# Patient Record
Sex: FEMALE | Race: WHITE | NOT HISPANIC OR LATINO | Employment: UNEMPLOYED | URBAN - METROPOLITAN AREA
[De-identification: names, ages, dates, MRNs, and addresses within clinical notes are randomized per-mention and may not be internally consistent; named-entity substitution may affect disease eponyms.]

---

## 2018-09-12 ENCOUNTER — HOSPITAL ENCOUNTER (EMERGENCY)
Facility: HOSPITAL | Age: 28
Discharge: HOME | End: 2018-09-12
Attending: EMERGENCY MEDICINE | Admitting: EMERGENCY MEDICINE

## 2018-09-12 VITALS
HEIGHT: 60 IN | TEMPERATURE: 98.2 F | SYSTOLIC BLOOD PRESSURE: 110 MMHG | OXYGEN SATURATION: 95 % | BODY MASS INDEX: 26.31 KG/M2 | WEIGHT: 134 LBS | RESPIRATION RATE: 15 BRPM | DIASTOLIC BLOOD PRESSURE: 75 MMHG | HEART RATE: 73 BPM

## 2018-09-12 DIAGNOSIS — N93.9 VAGINAL BLEEDING: Primary | ICD-10-CM

## 2018-09-12 LAB
AMORPH URATE CRY URNS QL MICRO: ABNORMAL /HPF
B-HCG UR QL: NEGATIVE
BACTERIA UR QL AUTO: ABNORMAL /HPF
BILIRUB UR QL STRIP: NEGATIVE
CLARITY UR: ABNORMAL
CLUE CELLS SPEC QL WET PREP: ABNORMAL
COLOR UR: YELLOW
GLUCOSE UR STRIP-MCNC: NEGATIVE MG/DL
HGB UR QL STRIP.AUTO: ABNORMAL
HYALINE CASTS UR QL AUTO: ABNORMAL /LPF
HYDATID CYST SPEC WET PREP: ABNORMAL
KETONES UR QL STRIP: NEGATIVE
KOH PREP NAIL: NORMAL
LEUKOCYTE ESTERASE UR QL STRIP.AUTO: NEGATIVE
NITRITE UR QL STRIP: NEGATIVE
PH UR STRIP.AUTO: 8 [PH] (ref 4.5–8)
PROT UR QL STRIP: NEGATIVE
RBC # UR: ABNORMAL /HPF
REF LAB TEST METHOD: ABNORMAL
SP GR UR STRIP: 1.01 (ref 1–1.03)
SQUAMOUS #/AREA URNS HPF: ABNORMAL /HPF
T VAGINALIS SPEC QL WET PREP: ABNORMAL
UROBILINOGEN UR QL STRIP: ABNORMAL
WBC SPEC QL WET PREP: ABNORMAL
WBC UR QL AUTO: ABNORMAL /HPF
YEAST GENITAL QL WET PREP: ABNORMAL

## 2018-09-12 PROCEDURE — 87081 CULTURE SCREEN ONLY: CPT | Performed by: EMERGENCY MEDICINE

## 2018-09-12 PROCEDURE — 87210 SMEAR WET MOUNT SALINE/INK: CPT | Performed by: EMERGENCY MEDICINE

## 2018-09-12 PROCEDURE — 87591 N.GONORRHOEAE DNA AMP PROB: CPT | Performed by: EMERGENCY MEDICINE

## 2018-09-12 PROCEDURE — 99283 EMERGENCY DEPT VISIT LOW MDM: CPT

## 2018-09-12 PROCEDURE — 81025 URINE PREGNANCY TEST: CPT | Performed by: EMERGENCY MEDICINE

## 2018-09-12 PROCEDURE — 87491 CHLMYD TRACH DNA AMP PROBE: CPT | Performed by: EMERGENCY MEDICINE

## 2018-09-12 PROCEDURE — 99282 EMERGENCY DEPT VISIT SF MDM: CPT | Performed by: EMERGENCY MEDICINE

## 2018-09-12 PROCEDURE — 87220 TISSUE EXAM FOR FUNGI: CPT | Performed by: EMERGENCY MEDICINE

## 2018-09-12 PROCEDURE — 81001 URINALYSIS AUTO W/SCOPE: CPT | Performed by: EMERGENCY MEDICINE

## 2018-09-12 PROCEDURE — 86592 SYPHILIS TEST NON-TREP QUAL: CPT | Performed by: EMERGENCY MEDICINE

## 2018-09-12 NOTE — ED PROVIDER NOTES
Subjective     History provided by:  Patient    History of Present Illness    · Chief complaint: Vaginal discharge and vaginal bleeding    · Location: Vaginal    · Quality/Severity: The patient has an increased whitish vaginal discharge with a older.  She also started having vaginal bleeding in spite of finishing her menstrual period 4 days ago.    · Timing/Onset: 3 days    · Modifying Factors: None    · Associated symptoms: Bilateral low back pain and pressure in her pelvic area.    · Narrative: The patient is a 27-year-old white female complaining of a three-day history of low back pain, a whitish vaginal discharge and has an odor, and restarting vaginal bleeding in spite of having her menstrual period 4 days ago.  The patient had both of her fallopian tubes removed for ovarian cyst.  She is sexually active and doesn't use condoms.  She's been in a monogamous relationship for the past 9 years.    ED Triage Vitals [09/12/18 1919]   Temp Heart Rate Resp BP SpO2   98.2 °F (36.8 °C) 78 16 117/74 99 %      Temp src Heart Rate Source Patient Position BP Location FiO2 (%)   Oral Monitor Sitting Right arm --       Review of Systems   Constitutional: Negative for activity change, appetite change, chills, diaphoresis, fatigue and fever.   HENT: Negative for congestion, dental problem, ear pain, hearing loss, mouth sores, postnasal drip, rhinorrhea, sinus pressure, sore throat and voice change.    Eyes: Negative for photophobia, pain, discharge, redness and visual disturbance.   Respiratory: Negative for cough, chest tightness, shortness of breath, wheezing and stridor.    Cardiovascular: Negative for chest pain, palpitations and leg swelling.   Gastrointestinal: Negative for abdominal pain, diarrhea, nausea and vomiting.   Genitourinary: Positive for vaginal bleeding and vaginal discharge. Negative for difficulty urinating, dysuria, flank pain, frequency, hematuria and urgency.   Musculoskeletal: Positive for back pain.  Negative for arthralgias, gait problem, joint swelling, myalgias, neck pain and neck stiffness.   Skin: Negative for color change and rash.   Neurological: Negative for dizziness, tremors, seizures, syncope, facial asymmetry, speech difficulty, weakness, light-headedness, numbness and headaches.   Hematological: Negative for adenopathy.   Psychiatric/Behavioral: Negative.  Negative for confusion and decreased concentration. The patient is not nervous/anxious.        History reviewed. No pertinent past medical history.    No Known Allergies    Past Surgical History:   Procedure Laterality Date   • ADENOIDECTOMY     • OVARIAN CYST DRAINAGE     • SALPINGECTOMY     • TONSILLECTOMY         History reviewed. No pertinent family history.    Social History     Social History   • Marital status:      Social History Main Topics   • Smoking status: Current Every Day Smoker     Packs/day: 0.50     Types: Cigarettes      Comment: 3 CIG PER DAY   • Alcohol use Yes      Comment: OCC   • Drug use: Yes     Types: Marijuana      Comment: 3 WEEKS   • Sexual activity: Yes      Comment: PT HAS TO HAVE IVF TO BE PREG     Other Topics Concern   • Not on file           Objective   Physical Exam   Constitutional: She is oriented to person, place, and time. She appears well-developed and well-nourished. No distress.   HENT:   Head: Normocephalic and atraumatic.   Nose: Nose normal.   Mouth/Throat: Oropharynx is clear and moist. No oropharyngeal exudate.   Eyes: Pupils are equal, round, and reactive to light. EOM are normal. Right eye exhibits no discharge. Left eye exhibits no discharge. No scleral icterus.   Neck: Normal range of motion. Neck supple. No JVD present. No thyromegaly present.   Cardiovascular: Normal rate, regular rhythm and normal heart sounds.    No murmur heard.  Pulmonary/Chest: Effort normal and breath sounds normal. She has no wheezes. She has no rales. She exhibits no tenderness.   Abdominal: Soft. Bowel sounds  are normal. She exhibits no distension. There is no tenderness.   Genitourinary:   Genitourinary Comments: The patient has dark red blood in the vaginal vault.  There is no abnormal discharge in the vaginal vault.  She has minimal cervix tenderness to manipulation, he does not have a chandelier sign.  She has no adnexal tenderness or mass.  Uterine size was difficult to appreciate due to obesity.   Musculoskeletal: Normal range of motion. She exhibits tenderness (bilateral flank and lower to percussion). She exhibits no edema or deformity.   Lymphadenopathy:     She has no cervical adenopathy.   Neurological: She is alert and oriented to person, place, and time. No cranial nerve deficit. Coordination normal.   No focal motor sensory deficit   Skin: Skin is warm and dry. Capillary refill takes less than 2 seconds. No rash noted. She is not diaphoretic.   Psychiatric: She has a normal mood and affect. Her behavior is normal. Judgment and thought content normal.   Nursing note and vitals reviewed.      Procedures           ED Course  ED Course as of Sep 12 2056   Wed Sep 12, 2018   2054 On exam the patient only had vaginal bleeding.  She did not clinically have evidence of an STD or PID.  Review of her laboratory studies: Her urinalysis was negative for infection.  Her beta-hCG was negative.  Her wet prep was negative for clue cells or trichomonas.  Her KOH was negative for hyphae.  Her RPR, chlamydia and gonorrhea screens are pending.  [TP]   2055 The patient has some dysfunctional vaginal bleeding.  I informed her that we will contact her should any of her culture results come back positive.  I instructed her that should her bleeding persists she should follow-up with her gynecologist Dr. Grant Dwyer.  [TP]      ED Course User Index  [TP] Lauri Good MD                  MDM  Number of Diagnoses or Management Options  Vaginal bleeding: new and requires workup     Amount and/or Complexity of Data  Reviewed  Clinical lab tests: ordered and reviewed    Patient Progress  Patient progress: stable        Final diagnoses:   Vaginal bleeding           Labs Reviewed   WET PREP, GENITAL - Abnormal; Notable for the following:        Result Value    WBC'S 1+ WBC's seen (*)     All other components within normal limits   URINALYSIS W/ MICROSCOPIC IF INDICATED (NO CULTURE) - Abnormal; Notable for the following:     Appearance, UA Cloudy (*)     Blood, UA Large (3+) (*)     All other components within normal limits   URINALYSIS, MICROSCOPIC ONLY - Abnormal; Notable for the following:     RBC, UA 13-20 (*)     Bacteria, UA Trace (*)     All other components within normal limits   KOH PREP - Normal   PREGNANCY, URINE - Normal   GONOCOCCUS CULTURE   CHLAMYDIA TRACHOMATIS, NEISSERIA GONORRHOEAE, PCR   RPR     No orders to display          Medication List      No changes were made to your prescriptions during this visit.            Lauri Good MD  09/12/18 6904

## 2018-09-13 LAB — RPR SER QL: NORMAL

## 2018-09-14 LAB
C TRACH RRNA SPEC DONR QL NAA+PROBE: NEGATIVE
N GONORRHOEA DNA SPEC QL NAA+PROBE: NEGATIVE

## 2018-09-15 LAB — BACTERIA SPEC AEROBE CULT: NORMAL

## 2024-12-11 DIAGNOSIS — K21.9 GASTROESOPHAGEAL REFLUX DISEASE, UNSPECIFIED WHETHER ESOPHAGITIS PRESENT: Primary | ICD-10-CM

## 2024-12-11 RX ORDER — PANTOPRAZOLE SODIUM 40 MG/1
40 TABLET, DELAYED RELEASE ORAL DAILY
Qty: 90 TABLET | Refills: 3 | Status: SHIPPED | OUTPATIENT
Start: 2024-12-11 | End: 2025-12-11

## 2025-03-04 ENCOUNTER — OFFICE VISIT (OUTPATIENT)
Dept: OBSTETRICS AND GYNECOLOGY | Age: 35
End: 2025-03-04
Payer: COMMERCIAL

## 2025-03-04 VITALS
WEIGHT: 152.4 LBS | HEIGHT: 60 IN | SYSTOLIC BLOOD PRESSURE: 124 MMHG | DIASTOLIC BLOOD PRESSURE: 72 MMHG | BODY MASS INDEX: 29.92 KG/M2

## 2025-03-04 DIAGNOSIS — Z13.29 SCREENING FOR THYROID DISORDER: ICD-10-CM

## 2025-03-04 DIAGNOSIS — Z13.1 SCREENING FOR DIABETES MELLITUS: ICD-10-CM

## 2025-03-04 DIAGNOSIS — Z01.419 ENCOUNTER FOR GYNECOLOGICAL EXAMINATION WITHOUT ABNORMAL FINDING: ICD-10-CM

## 2025-03-04 DIAGNOSIS — Z20.2 POSSIBLE EXPOSURE TO STD: ICD-10-CM

## 2025-03-04 DIAGNOSIS — Z31.9 DESIRE FOR PREGNANCY: Primary | ICD-10-CM

## 2025-03-04 DIAGNOSIS — R79.89 LOW VITAMIN D LEVEL: ICD-10-CM

## 2025-03-04 RX ORDER — FLUCONAZOLE 150 MG/1
150 TABLET ORAL ONCE
Qty: 2 TABLET | Refills: 0 | Status: SHIPPED | OUTPATIENT
Start: 2025-03-04 | End: 2025-03-04

## 2025-03-04 NOTE — PROGRESS NOTES
Routine Annual Visit    3/4/2025    Patient: Betty Padilla          MR#:[id number]      Chief Complaint   Patient presents with    Gynecologic Exam     New pt, AE today. Last AE several years. Pt has noticed increased vaginal irritation, self treated with monistat about 3 weeks ago        History of Present Illness    34 y.o. female  who presents for annual exam.     Three weeks ago had yeast infection and used Monistat and it seemed to go away but then recently she has some discomfort around the introitus again.    Menses are monthly and regular    Hx bilateral salpingectomy. Issue w STI and sounds like PID, sounds like TOA and had emergency surgery.  Had follow up surgery as well and has had both tubes removed eventually.     Did IVF with Dr. Owens in [place] in 2016 and had a fresh transfer and twin pregnancy and spontaneous loss. Does not have any frozen embryos. She is planning to pursue IVF again soon and doing some research, discussed this today.    Patient's last menstrual period was 2025 (approximate).  Obstetric History:  OB History          3    Para   1    Term   1            AB   2    Living   1         SAB   2    IAB        Ectopic        Molar        Multiple        Live Births   1               Menstrual History:     Patient's last menstrual period was 2025 (approximate).       ________________________________________  There is no problem list on file for this patient.      Past Medical History:   Diagnosis Date    Abnormal Pap smear of cervix        Family History   Problem Relation Age of Onset    Hypertension Mother     Ovarian cancer Maternal Grandmother        Past Surgical History:   Procedure Laterality Date    ADENOIDECTOMY      OVARIAN CYST DRAINAGE      SALPINGECTOMY      TONSILLECTOMY         Social History     Tobacco Use   Smoking Status Former    Current packs/day: 0.50    Types: Cigarettes   Smokeless Tobacco Never   Tobacco Comments    3 CIG  "PER DAY       has a current medication list which includes the following prescription(s): multivitamin with minerals and zinc.  ________________________________________        Objective   Physical Exam    /72   Ht 152.4 cm (60\")   Wt 69.1 kg (152 lb 6.4 oz)   LMP 02/12/2025 (Approximate)   BMI 29.76 kg/m²    BP Readings from Last 3 Encounters:   03/04/25 124/72   01/31/25 114/74   12/03/24 114/77      Wt Readings from Last 3 Encounters:   03/04/25 69.1 kg (152 lb 6.4 oz)   01/31/25 61.7 kg (136 lb)   12/03/24 59.9 kg (132 lb 0.9 oz)         BMI: Body mass index is 29.76 kg/m².       General:   alert, appears stated age, and cooperative   Neck: No thyromegaly or LAD   Abdomen: soft, non-tender, without masses or organomegaly   Breast: inspection negative, no nipple discharge or bleeding, no masses or nodularity palpable   Urethra and bladder: urethral meatus normal; bladder nontender to palpation;   Vulva: normal, Bartholin's, Urethra, Shinnston's normal   Vagina: Mild thick white discharge, some erythema of the introitus   Cervix: multiparous appearance and no lesions   Uterus: normal size, non-tender, and anteverted   Adnexa: normal adnexa and no mass, fullness, tenderness       Assessment:    normal annual exam   Vaginitis  Planning for pregnancy/IVF    Plan:    Plan     []  Mammogram request made  [x]  PAP done  []  Labs:   [x]  GC/Chl/TV  []  DEXA scan   []  Referral for colonoscopy:     Diagnoses and all orders for this visit:    1. Desire for pregnancy (Primary)  -     Rubella Antibody, IgG  -     Varicella Zoster Antibody, IgG    2. Screening for diabetes mellitus  -     Hemoglobin A1c    3. Low vitamin D level  -     Vitamin D 25 Hydroxy    4. Screening for thyroid disorder  -     TSH Rfx On Abnormal To Free T4    5. Encounter for gynecological examination without abnormal finding  -     IGP, Apt HPV,rfx 16 / 18,45    6. Possible exposure to STD  -     NuSwab VG+ - Swab, Cervix          Counseling  [] " Menopause  [x]  Nutrition  [x]  Physical activity/regular exercise   [x]  Healthy weight  []  Injury prevention  []  Smoking cessation  []  Substance misuse/abuse  [x]  Sexual behavior  []  STD prevention  []  Contraception  []  Dental health  [x]  Mental health  []  Immunization  [x]  Encouraged SBE        Janet Matthew MD  03/04/2025  13:47 EST

## 2025-03-05 LAB
25(OH)D3+25(OH)D2 SERPL-MCNC: 29.4 NG/ML (ref 30–100)
HBA1C MFR BLD: 5.1 % (ref 4.8–5.6)
RUBV IGG SERPL IA-ACNC: 1.94 INDEX
TSH SERPL DL<=0.005 MIU/L-ACNC: 1.72 UIU/ML (ref 0.45–4.5)
VZV IGG SER QL IA: REACTIVE

## 2025-03-06 LAB
A VAGINAE DNA VAG QL NAA+PROBE: ABNORMAL SCORE
BVAB2 DNA VAG QL NAA+PROBE: ABNORMAL SCORE
C ALBICANS DNA VAG QL NAA+PROBE: POSITIVE
C GLABRATA DNA VAG QL NAA+PROBE: NEGATIVE
C TRACH DNA SPEC QL NAA+PROBE: NEGATIVE
CYTOLOGIST CVX/VAG CYTO: NORMAL
CYTOLOGY CVX/VAG DOC CYTO: NORMAL
CYTOLOGY CVX/VAG DOC THIN PREP: NORMAL
DX ICD CODE: NORMAL
HPV I/H RISK 4 DNA CVX QL PROBE+SIG AMP: NEGATIVE
MEGA1 DNA VAG QL NAA+PROBE: ABNORMAL SCORE
N GONORRHOEA DNA VAG QL NAA+PROBE: NEGATIVE
OTHER STN SPEC: NORMAL
SERVICE CMNT-IMP: NORMAL
STAT OF ADQ CVX/VAG CYTO-IMP: NORMAL
T VAGINALIS DNA VAG QL NAA+PROBE: NEGATIVE

## 2025-06-02 ENCOUNTER — TELEPHONE (OUTPATIENT)
Dept: OBSTETRICS AND GYNECOLOGY | Age: 35
End: 2025-06-02
Payer: COMMERCIAL

## 2025-06-02 RX ORDER — FLUCONAZOLE 150 MG/1
150 TABLET ORAL ONCE
Qty: 2 TABLET | Refills: 0 | Status: SHIPPED | OUTPATIENT
Start: 2025-06-02 | End: 2025-06-02

## 2025-06-02 NOTE — TELEPHONE ENCOUNTER
Pt called states she has a yeast infection caused she's taking antibiotics for an upper respiratory infection . Pt wants to know if you can send her something in. Please advise

## 2025-06-04 ENCOUNTER — TELEPHONE (OUTPATIENT)
Dept: OBSTETRICS AND GYNECOLOGY | Age: 35
End: 2025-06-04
Payer: COMMERCIAL

## 2025-06-04 RX ORDER — FLUCONAZOLE 150 MG/1
150 TABLET ORAL ONCE
Qty: 2 TABLET | Refills: 0 | Status: SHIPPED | OUTPATIENT
Start: 2025-06-04 | End: 2025-06-04